# Patient Record
Sex: FEMALE
[De-identification: names, ages, dates, MRNs, and addresses within clinical notes are randomized per-mention and may not be internally consistent; named-entity substitution may affect disease eponyms.]

---

## 2019-01-04 ENCOUNTER — APPOINTMENT (OUTPATIENT)
Dept: PEDIATRICS | Facility: CLINIC | Age: 24
End: 2019-01-04
Payer: COMMERCIAL

## 2019-01-04 VITALS — WEIGHT: 149 LBS | TEMPERATURE: 98.4 F

## 2019-01-04 PROBLEM — Z00.00 ENCOUNTER FOR PREVENTIVE HEALTH EXAMINATION: Status: ACTIVE | Noted: 2019-01-04

## 2019-01-04 LAB
POCT - MONO RAPID TEST: NEGATIVE
S PYO AG SPEC QL IA: NEGATIVE

## 2019-01-04 PROCEDURE — 86308 HETEROPHILE ANTIBODY SCREEN: CPT | Mod: QW

## 2019-01-04 PROCEDURE — 99214 OFFICE O/P EST MOD 30 MIN: CPT

## 2019-01-04 PROCEDURE — 87880 STREP A ASSAY W/OPTIC: CPT | Mod: QW

## 2019-01-04 RX ORDER — FLUTICASONE PROPIONATE 50 UG/1
50 SPRAY, METERED NASAL
Qty: 16 | Refills: 0 | Status: COMPLETED | COMMUNITY
Start: 2018-12-21

## 2019-01-04 RX ORDER — PSEUDOEPHEDRINE HCL 30 MG/1
30 TABLET, FILM COATED ORAL
Qty: 40 | Refills: 0 | Status: COMPLETED | COMMUNITY
Start: 2018-12-21

## 2019-01-04 NOTE — HISTORY OF PRESENT ILLNESS
[FreeTextEntry6] : The patient has been sick for about 10 days or so. She  felt congested with aches and pains for a few weeks now. There's been no fever. Now, she has a sore throat for the past few days. (The sore throat is sudden, moderate, continuous, symmetrical, sharp, no known contact, better with humidifier)

## 2019-01-04 NOTE — PHYSICAL EXAM
[Supple] : supple [NL] : no abnormal lymph nodes palpated [FreeTextEntry5] : Conjunctiva and sclera are clear bilaterally  [de-identified] : Throat is somewhat red no pus.  [de-identified] : No rashes

## 2019-01-07 LAB — BACTERIA THROAT CULT: NORMAL

## 2019-05-15 ENCOUNTER — APPOINTMENT (OUTPATIENT)
Dept: PEDIATRICS | Facility: CLINIC | Age: 24
End: 2019-05-15
Payer: COMMERCIAL

## 2019-05-15 DIAGNOSIS — Z87.09 PERSONAL HISTORY OF OTHER DISEASES OF THE RESPIRATORY SYSTEM: ICD-10-CM

## 2019-05-15 DIAGNOSIS — Z86.19 PERSONAL HISTORY OF OTHER INFECTIOUS AND PARASITIC DISEASES: ICD-10-CM

## 2019-05-15 PROCEDURE — 99213 OFFICE O/P EST LOW 20 MIN: CPT | Mod: 25

## 2019-05-15 PROCEDURE — 10060 I&D ABSCESS SIMPLE/SINGLE: CPT

## 2019-05-15 RX ORDER — NITROFURANTOIN (MONOHYDRATE/MACROCRYSTALS) 25; 75 MG/1; MG/1
100 CAPSULE ORAL
Qty: 14 | Refills: 0 | Status: COMPLETED | COMMUNITY
Start: 2019-01-23

## 2019-05-15 NOTE — PHYSICAL EXAM
[Supple] : supple [NL] : warm [de-identified] : There is a red tender fluctuant area overlying the coccygeal region. It is exquisitely tender to touch. It is not warm to the touch.

## 2019-05-15 NOTE — DISCUSSION/SUMMARY
[FreeTextEntry1] : I numbed the area with 1% lidocaine. I then incised the area with a scalpel and the wound drained on its own easily. A culture was sent.

## 2019-05-15 NOTE — HISTORY OF PRESENT ILLNESS
[FreeTextEntry6] : Patient developed a painful lesion just above her buttocks. It has been there for 2-3 days and is getting worse. It is very painful now. There is no fever. No treatment was attempted. The patient does not know how or why it developed. She denies any trauma to the area.

## 2019-05-18 LAB — BACTERIA SPEC CULT: NORMAL

## 2021-05-07 ENCOUNTER — APPOINTMENT (OUTPATIENT)
Dept: PEDIATRICS | Facility: CLINIC | Age: 26
End: 2021-05-07
Payer: COMMERCIAL

## 2021-05-07 VITALS — TEMPERATURE: 98.5 F | WEIGHT: 134 LBS

## 2021-05-07 PROCEDURE — 99072 ADDL SUPL MATRL&STAF TM PHE: CPT

## 2021-05-07 PROCEDURE — 10060 I&D ABSCESS SIMPLE/SINGLE: CPT

## 2021-05-07 NOTE — PHYSICAL EXAM
[de-identified] : The area above her buttocks region only get a pilonidal abscess, is a little red. It is not very warm but it is somewhat tender to the touch. There does not feel like there is any fluid to drain.

## 2021-05-07 NOTE — DISCUSSION/SUMMARY
[FreeTextEntry1] : I did attempt and incision and drainage. I know the area with lidocaine and needle the period there was no pus.

## 2021-05-07 NOTE — HISTORY OF PRESENT ILLNESS
[FreeTextEntry6] : About a year ago, the patient had a pilonidal abscess. He was drained and she did well. Yesterday, she started getting pain in the same region. She has not had problems since last year. The pain is not significant at this time but she is concerned because she going away this weekend.

## 2021-05-12 ENCOUNTER — APPOINTMENT (OUTPATIENT)
Dept: PEDIATRICS | Facility: CLINIC | Age: 26
End: 2021-05-12
Payer: COMMERCIAL

## 2021-05-12 VITALS — TEMPERATURE: 97.9 F

## 2021-05-12 DIAGNOSIS — L05.01 PILONIDAL CYST WITH ABSCESS: ICD-10-CM

## 2021-05-12 PROCEDURE — 10060 I&D ABSCESS SIMPLE/SINGLE: CPT | Mod: 58

## 2021-05-12 PROCEDURE — 99072 ADDL SUPL MATRL&STAF TM PHE: CPT

## 2021-05-12 RX ORDER — PHENAZOPYRIDINE HYDROCHLORIDE 100 MG/1
100 TABLET ORAL
Qty: 12 | Refills: 0 | Status: COMPLETED | COMMUNITY
Start: 2020-11-24

## 2021-05-12 RX ORDER — CLINDAMYCIN PHOSPHATE 10 MG/ML
1 SOLUTION TOPICAL
Qty: 60 | Refills: 0 | Status: COMPLETED | COMMUNITY
Start: 2021-02-11

## 2021-05-12 RX ORDER — DOXYCYCLINE HYCLATE 100 MG/1
100 CAPSULE ORAL
Qty: 60 | Refills: 0 | Status: COMPLETED | COMMUNITY
Start: 2021-02-11

## 2021-05-12 RX ORDER — CEPHALEXIN 500 MG/1
500 CAPSULE ORAL
Qty: 14 | Refills: 0 | Status: COMPLETED | COMMUNITY
Start: 2020-11-24

## 2021-05-12 NOTE — DISCUSSION/SUMMARY
[FreeTextEntry1] : I infiltrated the area with lidocaine and then performed an I&D with a scalpel. Copious amounts of discharge drain including pus and a liquefied material. It was sent for culture.

## 2021-05-12 NOTE — HISTORY OF PRESENT ILLNESS
[FreeTextEntry6] : The patient was here last week with a pilonidal cyst. There was some minor pain and some redness. Although I needled it, there was no pus. I prescribed he. Since then, the situation has gotten worse and she is in more p

## 2021-05-12 NOTE — PHYSICAL EXAM
[de-identified] : The area is larger in redness and more tenderness.  It feels like there is pus to be drained

## 2021-05-16 LAB — BACTERIA SPEC CULT: ABNORMAL

## 2021-09-14 ENCOUNTER — APPOINTMENT (OUTPATIENT)
Dept: PEDIATRICS | Facility: CLINIC | Age: 26
End: 2021-09-14
Payer: COMMERCIAL

## 2021-09-14 DIAGNOSIS — H61.22 IMPACTED CERUMEN, LEFT EAR: ICD-10-CM

## 2021-09-14 PROCEDURE — 99213 OFFICE O/P EST LOW 20 MIN: CPT

## 2021-09-14 RX ORDER — MENTHOL/CAMPHOR/PHENOL
3 CREAM (GRAM) TOPICAL
Qty: 1 | Refills: 0 | Status: ACTIVE | COMMUNITY
Start: 2021-09-14

## 2021-09-14 NOTE — HISTORY OF PRESENT ILLNESS
[FreeTextEntry6] : The patient is complaining about a problem with her left ear.  She is complaining that she does not hear well out of the ear and that she also has a ringing in that ear.  She does not use the word pain to describe her symptoms.

## 2021-09-14 NOTE — PHYSICAL EXAM
[Clear] : right tympanic membrane clear [Supple] : supple [NL] : no abnormal lymph nodes palpated [FreeTextEntry5] : Conjunctiva and sclera are clear bilaterally  [FreeTextEntry3] : Left canal filled with wax.  TM obstructed.  After removal of wax.  TM is within normal limits. [de-identified] : The area is larger in redness and more tenderness.  It feels like there is pus to be drained [de-identified] : No rashes

## 2021-09-14 NOTE — DISCUSSION/SUMMARY
[FreeTextEntry1] : After the wax was removed, her symptoms were gone.  I advised on how to clean her ears on a regular basis.